# Patient Record
Sex: FEMALE | URBAN - METROPOLITAN AREA
[De-identification: names, ages, dates, MRNs, and addresses within clinical notes are randomized per-mention and may not be internally consistent; named-entity substitution may affect disease eponyms.]

---

## 2021-12-08 ENCOUNTER — IMPORTED ENCOUNTER (OUTPATIENT)
Dept: URBAN - METROPOLITAN AREA CLINIC 1 | Facility: CLINIC | Age: 41
End: 2021-12-08

## 2021-12-08 PROBLEM — H52.13: Noted: 2021-12-08

## 2021-12-08 PROCEDURE — S0620 ROUTINE OPHTHALMOLOGICAL EXA: HCPCS

## 2021-12-08 NOTE — PATIENT DISCUSSION
1. Myopia - Rx was given for correction if indicated and requested. New CTL trials given. Return for an appointment in 1 week for a CC with Dr. Filomena Boss. Return for an appointment in 1 year for a 40/cc with Dr. Filomena Boss.

## 2021-12-16 ENCOUNTER — IMPORTED ENCOUNTER (OUTPATIENT)
Dept: URBAN - METROPOLITAN AREA CLINIC 1 | Facility: CLINIC | Age: 41
End: 2021-12-16

## 2021-12-16 NOTE — PATIENT DISCUSSION
1.  CC today -- patient happy with comfort and vision. CTL RX finalized and given to patient today. Return for an appointment in 1 year 40/cc with Dr. Jamie Foote.

## 2022-04-08 ASSESSMENT — VISUAL ACUITY
OD_SC: 20/20-1
OS_SC: 20/20
OS_SC: 20/20
OS_CC: J1+
OD_SC: 20/20
OD_CC: J1+

## 2022-04-08 ASSESSMENT — KERATOMETRY
OD_AXISANGLE2_DEGREES: 083
OS_AXISANGLE_DEGREES: 002
OS_K1POWER_DIOPTERS: 44.25
OS_K2POWER_DIOPTERS: 45.00
OD_K2POWER_DIOPTERS: 45.50
OS_AXISANGLE2_DEGREES: 092
OD_AXISANGLE_DEGREES: 173
OD_K1POWER_DIOPTERS: 44.00

## 2022-04-08 ASSESSMENT — TONOMETRY
OD_IOP_MMHG: 16
OS_IOP_MMHG: 15